# Patient Record
Sex: MALE | Race: WHITE | URBAN - METROPOLITAN AREA
[De-identification: names, ages, dates, MRNs, and addresses within clinical notes are randomized per-mention and may not be internally consistent; named-entity substitution may affect disease eponyms.]

---

## 2018-02-05 ENCOUNTER — RADIANT APPOINTMENT (OUTPATIENT)
Dept: GENERAL RADIOLOGY | Facility: CLINIC | Age: 28
End: 2018-02-05
Attending: PHYSICIAN ASSISTANT
Payer: COMMERCIAL

## 2018-02-05 ENCOUNTER — OFFICE VISIT (OUTPATIENT)
Dept: URGENT CARE | Facility: URGENT CARE | Age: 28
End: 2018-02-05
Payer: COMMERCIAL

## 2018-02-05 VITALS — HEART RATE: 82 BPM | OXYGEN SATURATION: 99 % | TEMPERATURE: 98.6 F | WEIGHT: 235 LBS

## 2018-02-05 DIAGNOSIS — S99.921A FOOT INJURY, RIGHT, INITIAL ENCOUNTER: ICD-10-CM

## 2018-02-05 DIAGNOSIS — S99.912A INJURY OF LEFT ANKLE, INITIAL ENCOUNTER: Primary | ICD-10-CM

## 2018-02-05 DIAGNOSIS — S99.912A INJURY OF LEFT ANKLE, INITIAL ENCOUNTER: ICD-10-CM

## 2018-02-05 PROCEDURE — 99203 OFFICE O/P NEW LOW 30 MIN: CPT | Performed by: PHYSICIAN ASSISTANT

## 2018-02-05 PROCEDURE — 73630 X-RAY EXAM OF FOOT: CPT | Mod: RT

## 2018-02-05 PROCEDURE — 73610 X-RAY EXAM OF ANKLE: CPT | Mod: LT

## 2018-02-05 RX ORDER — DICLOFENAC SODIUM 75 MG/1
75 TABLET, DELAYED RELEASE ORAL 2 TIMES DAILY PRN
Qty: 30 TABLET | Refills: 0 | Status: SHIPPED | OUTPATIENT
Start: 2018-02-05

## 2018-02-05 RX ORDER — HYDROCODONE BITARTRATE AND ACETAMINOPHEN 5; 325 MG/1; MG/1
1-2 TABLET ORAL EVERY 4 HOURS PRN
Qty: 20 TABLET | Refills: 0 | Status: SHIPPED | OUTPATIENT
Start: 2018-02-05 | End: 2018-02-15

## 2018-02-05 NOTE — LETTER
[facility]  [address]  [address]  [facility] [address] [phone]      February 5, 2018    RE:  Brice Jones                                                                                                                                                       [address] [place] [address]            To whom it may concern:    Brice Jones is under my professional care for injury of foot and ankle.  Please excuse from missed work due to above injury. He has been seen and evaluated and currently taking med and using a brace for ankle.  May return to work with no restrictions.  Please allow to sit and ice and elevate during breaks        Sincerely,        Bren Severino    [facility]

## 2018-02-05 NOTE — MR AVS SNAPSHOT
"              After Visit Summary   2018    Brice Jones    MRN: [id number]           Patient Information     Date Of Birth          1990        Visit Information        Provider Department      2018 5:10 PM Bren Severino PA-C [facility]        Today's Diagnoses     Injury of left ankle, initial encounter    -  1    Foot injury, right, initial encounter           Follow-ups after your visit        Who to contact     If you have questions or need follow up information about today's clinic visit or your schedule please contact [facility] directly at [phone].  Normal or non-critical lab and imaging results will be communicated to you by Vitaldenthart, letter or phone within 4 business days after the clinic has received the results. If you do not hear from us within 7 days, please contact the clinic through Vitaldenthart or phone. If you have a critical or abnormal lab result, we will notify you by phone as soon as possible.  Submit refill requests through Labtrip or call your pharmacy and they will forward the refill request to us. Please allow 3 business days for your refill to be completed.          Additional Information About Your Visit        MyChart Information     Labtrip lets you send messages to your doctor, view your test results, renew your prescriptions, schedule appointments and more. To sign up, go to [email or web address] . Click on \"Log in\" on the left side of the screen, which will take you to the Welcome page. Then click on \"Sign up Now\" on the right side of the page.     You will be asked to enter the access code listed below, as well as some personal information. Please follow the directions to create your username and password.     Your access code is: [email or web address]  Expires: 2018 10:54 PM     Your access code will  in 90 days. If you need help or a new code, please call your [place] clinic or [phone].        Care EveryWhere ID "     This is your Care EveryWhere ID. This could be used by other organizations to access your [facility] medical records  [id number]        Your Vitals Were     Pulse Temperature Pulse Oximetry             82 98.6  F (37  C) (Oral) 99%          Blood Pressure from Last 3 Encounters:   No data found for BP    Weight from Last 3 Encounters:   02/05/18 235 lb (106.6 kg)                 Today's Medication Changes          These changes are accurate as of 2/5/18 10:54 PM.  If you have any questions, ask your nurse or doctor.               Start taking these medicines.        Dose/Directions    diclofenac 75 MG EC tablet   Commonly known as:  VOLTAREN   Used for:  Injury of left ankle, initial encounter, Foot injury, right, initial encounter   Started by:  Bren Severino PA-C        Dose:  75 mg   Take 1 tablet (75 mg) by mouth 2 times daily as needed for moderate pain   Quantity:  30 tablet   Refills:  0       HYDROcodone-acetaminophen 5-325 MG per tablet   Commonly known as:  NORCO   Used for:  Injury of left ankle, initial encounter, Foot injury, right, initial encounter   Started by:  Bren Severino PA-C        Dose:  1-2 tablet   Take 1-2 tablets by mouth every 4 hours as needed for moderate to severe pain   Quantity:  20 tablet   Refills:  0       order for DME   Used for:  Injury of left ankle, initial encounter   Started by:  Bren Severino PA-C        Equipment being ordered: Tri lock ankle brace   Quantity:  1 Device   Refills:  0            Where to get your medicines      Some of these will need a paper prescription and others can be bought over the counter.  Ask your nurse if you have questions.     Bring a paper prescription for each of these medications     diclofenac 75 MG EC tablet    HYDROcodone-acetaminophen 5-325 MG per tablet    order for DME                Primary Care Provider Fax #    Physician No Ref-Primary [phone]       No address on file        Equal Access to  Services     [facility]: Hadii aad ku hadaquilinojulio c Sogiulianaali, waaxda luqadaha, qaybta kaalmada andrey, rolando perez . So [facility] [phone].    ATENCIÓN: Si habla español, tiene a young disposición servicios gratuitos de asistencia lingüística. Llame al [phone].    We comply with applicable federal civil rights laws and [place] laws. We do not discriminate on the basis of race, color, national origin, age, disability, sex, sexual orientation, or gender identity.            Thank you!     Thank you for choosing [facility]  for your care. Our goal is always to provide you with excellent care. Hearing back from our patients is one way we can continue to improve our services. Please take a few minutes to complete the written survey that you may receive in the mail after your visit with us. Thank you!             Your Updated Medication List - Protect others around you: Learn how to safely use, store and throw away your medicines at www.disposemymeds.org.          This list is accurate as of 2/5/18 10:54 PM.  Always use your most recent med list.                   Brand Name Dispense Instructions for use Diagnosis    diclofenac 75 MG EC tablet    VOLTAREN    30 tablet    Take 1 tablet (75 mg) by mouth 2 times daily as needed for moderate pain    Injury of left ankle, initial encounter, Foot injury, right, initial encounter       HYDROcodone-acetaminophen 5-325 MG per tablet    NORCO    20 tablet    Take 1-2 tablets by mouth every 4 hours as needed for moderate to severe pain    Injury of left ankle, initial encounter, Foot injury, right, initial encounter       order for DME     1 Device    Equipment being ordered: Tri lock ankle brace    Injury of left ankle, initial encounter

## 2018-02-06 NOTE — PROGRESS NOTES
SUBJECTIVE:  Chief Complaint   Patient presents with     Urgent Care     Foot Injury     left ankle rolled while pt was walking on ice and right foot pain on and off      Ketanrandy Jones is a 27 year old male presents with a chief complaint of let ankle and right foot pain   States that slipped and rolled ankle when walking on the ice and bruised and swollen. Also somehow injured right foot.  He is needing to make sure that fine to work and wonders about a brace.   The patient complained of moderate pain  and has had decreased ROM.  Pain exacerbated by walking, flexion/extension and worse after standing for long period.  Relieved by rest.  He treated it initially with ice and OTC med. This is not the first time this type of injury has occurred to this patient.     Generally healthy and no underlying medical issues.  No daily medications    No past medical history on file.  Current Outpatient Prescriptions   Medication Sig Dispense Refill     HYDROcodone-acetaminophen (NORCO) 5-325 MG per tablet Take 1-2 tablets by mouth every 4 hours as needed for moderate to severe pain 20 tablet 0     diclofenac (VOLTAREN) 75 MG EC tablet Take 1 tablet (75 mg) by mouth 2 times daily as needed for moderate pain 30 tablet 0     order for DME Equipment being ordered: Tri lock ankle brace 1 Device 0     Social History   Substance Use Topics     Smoking status: Not on file     Smokeless tobacco: Not on file     Alcohol use Not on file       ROS:  Review of systems negative except as stated above.    EXAM:   Pulse 82  Temp 98.6  F (37  C) (Oral)  Wt 235 lb (106.6 kg)  SpO2 99%  Gen: healthy,alert,no distress  Extremity:  Left ankle with mild swelling and tenderness over latera aspect.  No bruising or deformity.  Negative squeeze test.  No ligament instability noted.   Right foot with main in mid foot generalized .   There is not compromise to the distal circulation.  Pulses are +2 and CRT is brisk  GENERAL APPEARANCE: healthy,  alert and no distress  EXTREMITIES: peripheral pulses normal  SKIN: no suspicious lesions or rashes  NEURO: Normal strength and tone, sensory exam grossly normal, mentation intact and speech normal    X-RAY was done. - left ankle and right foot with no evidence for fracture    ASSESSMENT:   sprain/strain of ankle and foot    PLAN:  1) Rest, Ice, Compress, Elevate and ankle brace given for support and note for work.  Diclofenac as needed.  Few pain med for at night.  FU with PCP as needed.